# Patient Record
(demographics unavailable — no encounter records)

---

## 2017-02-16 NOTE — PHYS DOC
Past Medical History


Past Medical History:  Hypertension


Additional Past Medical Histor:  fibroids/cyst, tachycardia


Past Surgical History:  Other


Additional Past Surgical Histo:  L wrist surgery, R leg surgery


Alcohol Use:  Occasionally


Drug Use:  None





Adult General


Chief Complaint


Chief Complaint:  ABDOMINAL PAIN





[facility]


36-year-old female with history of chronic abdominal and leg pain who takes 

oxycodone daily and is here visiting from out of town presents stating she is 

out of her chronic pain medicine and having pain in her abdomen and leg. She 

states she is ready to go home in the next couple of days. She states the pain 

is no worse than typical she's having trouble not taking her opiate pain 

medication. She has had some nausea. []





Review of Systems


Review of Systems





Constitutional: Denies fever or chills []


Eyes: Denies change in visual acuity, redness, or eye pain []


HENT: Denies nasal congestion or sore throat []


Respiratory: Denies cough or shortness of breath []


Cardiovascular: No additional information not addressed in HPI []


GI: Denies abdominal pain, nausea, vomiting, bloody stools or diarrhea []


: Denies dysuria or hematuria []


Musculoskeletal: Denies back pain or joint pain []


Integument: Denies rash or skin lesions []


Neurologic: Denies headache, focal weakness or sensory changes []


Endocrine: Denies polyuria or polydipsia []





Allergies


Allergies





 Allergies








Coded Allergies Type Severity Reaction Last Updated Verified


 


  morphine Allergy Intermediate [facility] 2/16/17 Yes











Physical Exam


Physical Exam





Constitutional: Well developed, well nourished, no acute distress, non-toxic 

appearance. []


HENT: Normocephalic, atraumatic, bilateral external ears normal, oropharynx 

moist, no oral exudates, nose normal. []


Eyes: PERRLA, EOMI, conjunctiva normal, no discharge. [] 


Neck: Normal range of motion, no tenderness, supple, no stridor. [] 


Cardiovascular:Heart rate regular rhythm, no murmur []


Lungs & Thorax:  Bilateral breath sounds clear to auscultation []


Abdomen: Bowel sounds normal, soft, no tenderness, no masses, no pulsatile 

masses. [] 


Skin: Warm, dry, no erythema, no rash. [] 


Back: No tenderness, no CVA tenderness. [] 


Extremities: No tenderness, no cyanosis, no clubbing, ROM intact, no edema. [] 


Neurologic: Alert and oriented X 3, normal motor function, normal sensory 

function, no focal deficits noted. []


Psychologic: Affect normal, judgement normal, mood normal. []





Current Patient Data


Vital Signs





 Vital Signs








  Date Time  Temp Pulse Resp B/P Pulse Ox O2 Delivery O2 Flow Rate FiO2


 


2/16/17 10:33 98.6 95 18 132/91 98 Room Air  





 98.6       











EKG


EKG


[]





Radiology/Procedures


Radiology/Procedures


[]





Course & Med Decision Making


Course & Med Decision Making


Pertinent Labs and Imaging studies reviewed. (See chart for details)





[]





Dragon Disclaimer


Dragon Disclaimer


This electronic medical record was generated, in whole or in part, using a 

voice recognition dictation system.





Departure


Departure


Impression:  


 Primary Impression:  


 Chronic pain disorder


 Additional Impression:  


 Opioid withdrawal


Disposition:  01 HOME, SELF-CARE


Condition:  STABLE


Patient Instructions:  Chronic Pain, Chronic Pain Management, Narcotic 

Withdrawal





Additional Instructions:


Thank you for allowing us to participate in your care today.





Followup with your primary care physician in 3 days if your symptoms do not 

improve. Return to the emergency department you have any new or concerning 

findings.





This should be evaluated by the primary care physician and any necessary 

consulting services for continued management within a few days after discharge. 

Return to emergency room if you have any  new or concerning symptoms including 

but not limited to fever, chills, nausea, vomiting, intractable pain, any new 

rashes, chest pain, shortness of air, uncontrolled bleeding, difficulty 

breathing, and/or vision loss.





You may have been prescribed medication that can change in your level of 

thinking and ability to operate machinery. These medications include 

hydrocodone and Ativan. Also, Benadryl has been known to do this as well. Be 

sure to check with your pharmacist and ask if the medications you've prescribed 

can affect your level of consciousness. I recommend not operating heavy 

machinery or driving while on medication such as these.


Scripts


Oxycodone/Apap 5-325 (Percocet 5-325 Mg Tablet)1 Each Tablet1 Tab PO PRN Q6HRS 

PRN PAIN #20 TAB


   Prov:KINA RICE DO         2/16/17





Problem Qualifiers








KINA RICE DO Feb 16, 2017 10:48